# Patient Record
Sex: MALE | Race: ASIAN | Employment: PART TIME | URBAN - METROPOLITAN AREA
[De-identification: names, ages, dates, MRNs, and addresses within clinical notes are randomized per-mention and may not be internally consistent; named-entity substitution may affect disease eponyms.]

---

## 2017-05-09 ENCOUNTER — TELEPHONE (OUTPATIENT)
Dept: CARDIOLOGY CLINIC | Age: 72
End: 2017-05-09

## 2017-05-22 ENCOUNTER — TELEPHONE (OUTPATIENT)
Dept: CARDIOLOGY CLINIC | Age: 72
End: 2017-05-22

## 2017-05-22 NOTE — TELEPHONE ENCOUNTER
Called pt to sched 9 month f/u. I explained why I was calling and he said \"no\" then hung up. I am mailing letter out to try to sched.

## 2017-12-19 ENCOUNTER — OFFICE VISIT (OUTPATIENT)
Dept: FAMILY MEDICINE CLINIC | Age: 72
End: 2017-12-19

## 2017-12-19 VITALS
HEART RATE: 89 BPM | WEIGHT: 191 LBS | BODY MASS INDEX: 31.82 KG/M2 | TEMPERATURE: 96.6 F | HEIGHT: 65 IN | DIASTOLIC BLOOD PRESSURE: 76 MMHG | SYSTOLIC BLOOD PRESSURE: 132 MMHG | OXYGEN SATURATION: 98 % | RESPIRATION RATE: 16 BRPM

## 2017-12-19 DIAGNOSIS — M1A.09X0 CHRONIC GOUT OF MULTIPLE SITES, UNSPECIFIED CAUSE: ICD-10-CM

## 2017-12-19 DIAGNOSIS — I10 ESSENTIAL HYPERTENSION: Primary | ICD-10-CM

## 2017-12-19 RX ORDER — ALLOPURINOL 300 MG/1
300 TABLET ORAL DAILY
Qty: 90 TAB | Refills: 3 | Status: SHIPPED | OUTPATIENT
Start: 2017-12-19

## 2017-12-19 RX ORDER — COLCHICINE 0.6 MG/1
TABLET ORAL
Qty: 30 TAB | Refills: 0 | Status: SHIPPED | OUTPATIENT
Start: 2017-12-19

## 2017-12-19 RX ORDER — LOSARTAN POTASSIUM 50 MG/1
50 TABLET ORAL DAILY
Qty: 90 TAB | Refills: 3 | Status: SHIPPED | OUTPATIENT
Start: 2017-12-19

## 2017-12-19 RX ORDER — LOSARTAN POTASSIUM 50 MG/1
TABLET ORAL DAILY
COMMUNITY
End: 2017-12-19 | Stop reason: SDUPTHER

## 2017-12-19 NOTE — MR AVS SNAPSHOT
Visit Information Date & Time Provider Department Dept. Phone Encounter #  
 12/19/2017 11:30 AM Jonathan Cain NP [address] [phone] [id number] Follow-up Instructions Return in about 4 months (around 4/19/2018), or if symptoms worsen or fail to improve. Upcoming Health Maintenance Date Due Hepatitis C Screening 1945 DTaP/Tdap/Td series (1 - Tdap) 8/19/1967 FOBT Q 1 YEAR AGE 50-75 8/19/1996 ZOSTER VACCINE AGE 60> 6/19/2006 GLAUCOMA SCREENING Q2Y 8/19/2011 Pneumococcal 65+ Low/Medium Risk (1 of 2 - PCV13) 8/19/2011 MEDICARE YEARLY EXAM 8/19/2011 Influenza Age 5 to Adult 8/1/2017 Allergies as of 12/19/2017  Review Complete On: 12/19/2017 By: Jonathan Cain NP No Known Allergies Current Immunizations  Reviewed on 11/4/2016 Name Date Influenza Vaccine 10/11/2016, 11/18/2015 Not reviewed this visit You Were Diagnosed With   
  
 Codes Comments Essential hypertension    -  Primary ICD-10-CM: I10 
ICD-9-CM: 401.9 Chronic gout of multiple sites, unspecified cause     ICD-10-CM: M1A. 31A7 ICD-9-CM: 274.02 Vitals BP Pulse Temp Resp Height(growth percentile) Weight(growth percentile) 132/76 89 96.6 °F (35.9 °C) (Oral) 16 5' 5\" (1.651 m) 191 lb (86.6 kg) SpO2 BMI Smoking Status 98% 31.78 kg/m2 Never Smoker BMI and BSA Data Body Mass Index Body Surface Area 31.78 kg/m 2 1.99 m 2 Preferred Pharmacy Pharmacy Name Phone [facility] [address] [place] [address] [phone] Your Updated Medication List  
  
   
This list is accurate as of: 12/19/17 11:40 AM.  Always use your most recent med list.  
  
  
  
  
 allopurinol 300 mg tablet Commonly known as:  Mague Reddish Take 1 Tab by mouth daily. carvedilol 6.25 mg tablet Commonly known as:  Misty Arellano Take 1 Tab by mouth two (2) times daily (with meals). cholecalciferol (vitamin D3) 2,000 unit Tab Take 1 Tab by mouth daily. Indications: PREVENTION OF VITAMIN D DEFICIENCY  
  
 colchicine 0.6 mg tablet Take 2 tabs at first sign of gout flare, then 1 tab 1 hour later. Not to exceed 3 tabs in 1 hour period. Indications: Gout  
  
 losartan 50 mg tablet Commonly known as:  COZAAR Take 1 Tab by mouth daily. Prescriptions Sent to Pharmacy Refills  
 losartan (COZAAR) 50 mg tablet 3 Sig: Take 1 Tab by mouth daily. Class: Normal  
 Pharmacy: [id number] [place] [address], [address] [address]. Ph #: [phone] Route: Oral  
 allopurinol (ZYLOPRIM) 300 mg tablet 3 Sig: Take 1 Tab by mouth daily. Class: Normal  
 Pharmacy: [id number] [place] [address], [address] [address]. Ph #: [phone] Route: Oral  
 colchicine 0.6 mg tablet 0 Sig: Take 2 tabs at first sign of gout flare, then 1 tab 1 hour later. Not to exceed 3 tabs in 1 hour period. Indications: Gout Class: Normal  
 Pharmacy: [id number] [place] [address], [address] [address]. Ph #: [phone] Follow-up Instructions Return in about 4 months (around 4/19/2018), or if symptoms worsen or fail to improve. To-Do List   
 12/19/2017 Lab:  METABOLIC PANEL, COMPREHENSIVE Patient Instructions Gout: Care Instructions Your Care Instructions Gout is a form of arthritis caused by a buildup of uric acid crystals in a joint. It causes sudden attacks of pain, swelling, redness, and stiffness, usually in one joint, especially the big toe. Gout usually comes on without a cause. But it can be brought on by drinking alcohol (especially beer) or eating seafood and red meat. Taking certain medicines, such as diuretics or aspirin, also can bring on an attack of gout. Taking your medicines as prescribed and following up with your doctor regularly can help you avoid gout attacks in the future. Follow-up care is a key part of your treatment and safety. Be sure to make and go to all appointments, and call your doctor if you are having problems. It's also a good idea to know your test results and keep a list of the medicines you take. How can you care for yourself at home? · If the joint is swollen, put ice or a cold pack on the area for 10 to 20 minutes at a time. Put a thin cloth between the ice and your skin. · Prop up the sore limb on a pillow when you ice it or anytime you sit or lie down during the next 3 days. Try to keep it above the level of your heart. This will help reduce swelling. · Rest sore joints. Avoid activities that put weight or strain on the joints for a few days. Take short rest breaks from your regular activities during the day. · Take your medicines exactly as prescribed. Call your doctor if you think you are having a problem with your medicine. · Take pain medicines exactly as directed. ¨ If the doctor gave you a prescription medicine for pain, take it as prescribed. ¨ If you are not taking a prescription pain medicine, ask your doctor if you can take an over-the-counter medicine. · Eat less seafood and red meat. · Check with your doctor before drinking alcohol. · Losing weight, if you are overweight, may help reduce attacks of gout. But do not go on a [employer]. \" Losing a lot of weight in a short amount of time can cause a gout attack. When should you call for help? Call your doctor now or seek immediate medical care if: 
? · You have a fever. ? · The joint is so painful you cannot use it. ? · You have sudden, unexplained swelling, redness, warmth, or severe pain in one or more joints. ? Watch closely for changes in your health, and be sure to contact your doctor if: 
? · You have joint pain. ? · Your symptoms get worse or are not improving after 2 or 3 days. Where can you learn more? Go to http://anmol-fara.info/. Enter J382 in the search box to learn more about \"Gout: Care Instructions. \" Current as of: October 31, 2016 Content Version: 11.4 © 7946-7547 [employer]. Care instructions adapted under license by [employer] (which disclaims liability or warranty for this information). If you have questions about a medical condition or this instruction, always ask your healthcare professional. [address] any warranty or liability for your use of this information. Introducing [facility]! Dear Joaquina Brooks: Thank you for requesting a [email or web address] account. Our records indicate that you already have an active [email or web address] account. You can access your account anytime at [email or web address] Did you know that you can access your hospital and ER discharge instructions at any time in [email or web address]? You can also review all of your test results from your hospital stay or ER visit. Additional Information If you have questions, please visit the Frequently Asked Questions section of the [email or web address] website at [email or web address] Remember, [email or web address] is NOT to be used for urgent needs. For medical emergencies, dial 911. Now available from your iPhone and Android! Please provide this summary of care documentation to your next provider. Your primary care clinician is listed as Nicolas Weller. If you have any questions after today's visit, please call [phone].

## 2017-12-19 NOTE — PATIENT INSTRUCTIONS
Gout: Care Instructions  Your Care Instructions    Gout is a form of arthritis caused by a buildup of uric acid crystals in a joint. It causes sudden attacks of pain, swelling, redness, and stiffness, usually in one joint, especially the big toe. Gout usually comes on without a cause. But it can be brought on by drinking alcohol (especially beer) or eating seafood and red meat. Taking certain medicines, such as diuretics or aspirin, also can bring on an attack of gout. Taking your medicines as prescribed and following up with your doctor regularly can help you avoid gout attacks in the future. Follow-up care is a key part of your treatment and safety. Be sure to make and go to all appointments, and call your doctor if you are having problems. It's also a good idea to know your test results and keep a list of the medicines you take. How can you care for yourself at home? · If the joint is swollen, put ice or a cold pack on the area for 10 to 20 minutes at a time. Put a thin cloth between the ice and your skin. · Prop up the sore limb on a pillow when you ice it or anytime you sit or lie down during the next 3 days. Try to keep it above the level of your heart. This will help reduce swelling. · Rest sore joints. Avoid activities that put weight or strain on the joints for a few days. Take short rest breaks from your regular activities during the day. · Take your medicines exactly as prescribed. Call your doctor if you think you are having a problem with your medicine. · Take pain medicines exactly as directed. ¨ If the doctor gave you a prescription medicine for pain, take it as prescribed. ¨ If you are not taking a prescription pain medicine, ask your doctor if you can take an over-the-counter medicine. · Eat less seafood and red meat. · Check with your doctor before drinking alcohol. · Losing weight, if you are overweight, may help reduce attacks of gout. But do not go on a [employer]. \" Losing a lot of weight in a short amount of time can cause a gout attack. When should you call for help? Call your doctor now or seek immediate medical care if:  ? · You have a fever. ? · The joint is so painful you cannot use it. ? · You have sudden, unexplained swelling, redness, warmth, or severe pain in one or more joints. ? Watch closely for changes in your health, and be sure to contact your doctor if:  ? · You have joint pain. ? · Your symptoms get worse or are not improving after 2 or 3 days. Where can you learn more? Go to http://anmol-fara.info/. Enter X288 in the search box to learn more about \"Gout: Care Instructions. \"  Current as of: October 31, 2016  Content Version: 11.4  © 8491-1444 "[employer]. Care instructions adapted under license by [employer] (which disclaims liability or warranty for this information). If you have questions about a medical condition or this instruction, always ask your healthcare professional. [address] any warranty or liability for your use of this information.

## 2017-12-19 NOTE — PROGRESS NOTES
No headache, chest stephanie pressure palpita, diff breth, nvcd, edema  Subjective:   Millie Sheldon is a 71 y.o. male here for an acute visit for    Chief Complaint   Patient presents with    Follow-up     Medication Refill       HPI    Comes to the office today for management of his chronic condtions and medications refills. Denies any headache, chest pain, chest pressure, palpitations, difficulty breathing, n/v/c/d, edema, cough. ROS  As above, the rest are negative    Current Outpatient Prescriptions   Medication Sig Dispense Refill    losartan (COZAAR) 50 mg tablet Take 1 Tab by mouth daily. 90 Tab 3    allopurinol (ZYLOPRIM) 300 mg tablet Take 1 Tab by mouth daily. 90 Tab 3    colchicine 0.6 mg tablet Take 2 tabs at first sign of gout flare, then 1 tab 1 hour later. Not to exceed 3 tabs in 1 hour period. Indications: Gout 30 Tab 0    carvedilol (COREG) 6.25 mg tablet Take 1 Tab by mouth two (2) times daily (with meals). 60 Tab 6    cholecalciferol, vitamin D3, 2,000 unit tab Take 1 Tab by mouth daily. Indications: PREVENTION OF VITAMIN D DEFICIENCY 30 Tab 2        Patient Active Problem List   Diagnosis Code    Vitamin D deficiency E55.9    Chronic gout of multiple sites M1A. 09X0    Heart murmur R01.1    Essential hypertension I10       No Known Allergies    Objective:     Vitals:    12/19/17 1109   BP: 132/76   Pulse: 89   Resp: 16   Temp: 96.6 °F (35.9 °C)   TempSrc: Oral   SpO2: 98%   Weight: 191 lb (86.6 kg)   Height: 5' 5\" (1.651 m)      Body mass index is 31.78 kg/(m^2). Appearance: alert, well appearing, and in no distress and normal appearing weight. ENT normal.  Neck supple. No adenopathy or thyromegaly. PERRLA. Lungs are clear, good air entry, no wheezes, rhonchi or rales. S1 and S2 normal, no murmurs, regular rate and rhythm. Extremities show no edema,    Neurological is normal, no focal findings. Assessment/Plan:    Diagnoses and all orders for this visit:    1.  Essential hypertension  -     losartan (COZAAR) 50 mg tablet; Take 1 Tab by mouth daily.  -     METABOLIC PANEL, COMPREHENSIVE; Future    2. Chronic gout of multiple sites, unspecified cause  -     allopurinol (ZYLOPRIM) 300 mg tablet; Take 1 Tab by mouth daily. -     colchicine 0.6 mg tablet; Take 2 tabs at first sign of gout flare, then 1 tab 1 hour later. Not to exceed 3 tabs in 1 hour period. Indications: Gout    -No new complaints today, Check labs for renal and liver functions. -F/U in 3-4 months  -Re-explained the use of Allopurinol, take every day, and colchicine, take when having a gout flairup. Patient verbalized understanding to above instructions. AVS printed and given to pt. ZORA Bynum-BC  [address] [facility]   [address] [facility] [address]  [address]

## 2017-12-19 NOTE — PROGRESS NOTES
1. Have you been to the ER, urgent care clinic since your last visit? Hospitalized since your last visit? No    2. Have you seen or consulted any other health care providers outside of the [address] [place] [address] since your last visit? Include any pap smears or colon screening. No         Patient here today for a follow-up and medication refill.

## 2017-12-20 LAB
ALBUMIN SERPL-MCNC: 4.2 G/DL (ref 3.5–4.8)
ALBUMIN/GLOB SERPL: 1.4 {RATIO} (ref 1.2–2.2)
ALP SERPL-CCNC: 60 IU/L (ref 39–117)
ALT SERPL-CCNC: 11 IU/L (ref 0–44)
AST SERPL-CCNC: 16 IU/L (ref 0–40)
BILIRUB SERPL-MCNC: 0.6 MG/DL (ref 0–1.2)
BUN SERPL-MCNC: 9 MG/DL (ref 8–27)
BUN/CREAT SERPL: 10 (ref 10–24)
CALCIUM SERPL-MCNC: 8.7 MG/DL (ref 8.6–10.2)
CHLORIDE SERPL-SCNC: 100 MMOL/L (ref 96–106)
CO2 SERPL-SCNC: 22 MMOL/L (ref 18–29)
CREAT SERPL-MCNC: 0.9 MG/DL (ref 0.76–1.27)
GLOBULIN SER CALC-MCNC: 2.9 G/DL (ref 1.5–4.5)
GLUCOSE SERPL-MCNC: 116 MG/DL (ref 65–99)
POTASSIUM SERPL-SCNC: 4.1 MMOL/L (ref 3.5–5.2)
PROT SERPL-MCNC: 7.1 G/DL (ref 6–8.5)
SODIUM SERPL-SCNC: 140 MMOL/L (ref 134–144)